# Patient Record
Sex: FEMALE | Race: WHITE | HISPANIC OR LATINO | URBAN - METROPOLITAN AREA
[De-identification: names, ages, dates, MRNs, and addresses within clinical notes are randomized per-mention and may not be internally consistent; named-entity substitution may affect disease eponyms.]

---

## 2017-06-23 ENCOUNTER — FOLLOW UP ESTABLISHED (OUTPATIENT)
Dept: URBAN - METROPOLITAN AREA CLINIC 60 | Facility: CLINIC | Age: 67
End: 2017-06-23
Payer: COMMERCIAL

## 2017-06-23 DIAGNOSIS — H04.123 TEAR FILM INSUFFICIENCY OF BILATERAL LACRIMAL GLANDS: Primary | ICD-10-CM

## 2017-06-23 PROCEDURE — 83861 MICROFLUID ANALY TEARS: CPT | Performed by: OPHTHALMOLOGY

## 2017-06-23 PROCEDURE — 92014 COMPRE OPH EXAM EST PT 1/>: CPT | Performed by: OPHTHALMOLOGY

## 2017-06-23 ASSESSMENT — VISUAL ACUITY
OS: 20/20
OD: 20/20

## 2017-06-23 ASSESSMENT — INTRAOCULAR PRESSURE
OS: 12
OD: 14

## 2019-06-06 ENCOUNTER — FOLLOW UP ESTABLISHED (OUTPATIENT)
Dept: URBAN - METROPOLITAN AREA CLINIC 60 | Facility: CLINIC | Age: 69
End: 2019-06-06
Payer: COMMERCIAL

## 2019-06-06 DIAGNOSIS — H11.153 PINGUECULA, BILATERAL: ICD-10-CM

## 2019-06-06 DIAGNOSIS — H18.59 OTHER HEREDITARY CORNEAL DYSTROPHIES: Primary | ICD-10-CM

## 2019-06-06 DIAGNOSIS — Z96.1 PRESENCE OF INTRAOCULAR LENS: ICD-10-CM

## 2019-06-06 PROCEDURE — 92014 COMPRE OPH EXAM EST PT 1/>: CPT | Performed by: OPTOMETRIST

## 2019-06-06 PROCEDURE — 92004 COMPRE OPH EXAM NEW PT 1/>: CPT | Performed by: OPTOMETRIST

## 2019-06-06 RX ORDER — SIMVASTATIN 20 MG/1
20 MG TABLET, FILM COATED ORAL
Qty: 0 | Refills: 0 | Status: ACTIVE
Start: 2019-06-06

## 2019-06-06 RX ORDER — SIMVASTATIN 20 MG/1
20 MG TABLET, FILM COATED ORAL
Qty: 0 | Refills: 0 | Status: INACTIVE
Start: 2019-06-06 | End: 2019-06-06

## 2019-06-06 ASSESSMENT — INTRAOCULAR PRESSURE
OD: 14
OS: 14

## 2019-06-06 ASSESSMENT — VISUAL ACUITY
OD: 20/30
OS: 20/40

## 2020-02-25 ENCOUNTER — OFFICE VISIT (OUTPATIENT)
Dept: URBAN - METROPOLITAN AREA CLINIC 62 | Facility: CLINIC | Age: 70
End: 2020-02-25
Payer: MEDICAID

## 2020-02-25 DIAGNOSIS — H59.89: Primary | ICD-10-CM

## 2020-02-25 PROCEDURE — 92002 INTRM OPH EXAM NEW PATIENT: CPT | Performed by: OPTOMETRIST

## 2020-02-25 PROCEDURE — 92071 CONTACT LENS FITTING FOR TX: CPT | Performed by: OPTOMETRIST

## 2020-02-25 RX ORDER — OFLOXACIN 3 MG/ML
0.3 % SOLUTION/ DROPS OPHTHALMIC
Qty: 1 | Refills: 0 | Status: INACTIVE
Start: 2020-02-25 | End: 2021-06-01

## 2020-02-25 ASSESSMENT — INTRAOCULAR PRESSURE
OS: 15
OD: 16

## 2020-02-25 NOTE — IMPRESSION/PLAN
Impression: Other postprocedural complication of eye: I68.13. Plan: Exposed suture @ 10 o'clock,  Discussed diagnosis in detail with patient. Refer to Dr Carlos Arechiga for removal. BCL placed today. Start ofloxacin OD BID until next visit.

## 2020-03-06 ENCOUNTER — OFFICE VISIT (OUTPATIENT)
Dept: URBAN - METROPOLITAN AREA CLINIC 62 | Facility: CLINIC | Age: 70
End: 2020-03-06
Payer: COMMERCIAL

## 2020-03-06 PROCEDURE — 99213 OFFICE O/P EST LOW 20 MIN: CPT | Performed by: OPHTHALMOLOGY

## 2020-03-06 NOTE — IMPRESSION/PLAN
Impression: Other postprocedural complication of eye: N60.31. Plan: Removed contact and  suture today. Cont. ofloxacin OD BID.

## 2020-03-10 ENCOUNTER — OFFICE VISIT (OUTPATIENT)
Dept: URBAN - METROPOLITAN AREA CLINIC 62 | Facility: CLINIC | Age: 70
End: 2020-03-10
Payer: COMMERCIAL

## 2020-03-10 PROCEDURE — 92012 INTRM OPH EXAM EST PATIENT: CPT | Performed by: OPTOMETRIST

## 2020-03-10 ASSESSMENT — INTRAOCULAR PRESSURE
OS: 13
OD: 15

## 2020-03-10 NOTE — IMPRESSION/PLAN
Impression: Other postprocedural complication of eye: B43.17. Plan: Resolved. No cornea staining. Continue Ofloxacin OD BID for 3 days, then stop.

## 2021-06-01 ENCOUNTER — OFFICE VISIT (OUTPATIENT)
Dept: URBAN - METROPOLITAN AREA CLINIC 60 | Facility: CLINIC | Age: 71
End: 2021-06-01
Payer: COMMERCIAL

## 2021-06-01 DIAGNOSIS — H43.813 VITREOUS DEGENERATION, BILATERAL: ICD-10-CM

## 2021-06-01 DIAGNOSIS — H52.4 PRESBYOPIA: ICD-10-CM

## 2021-06-01 DIAGNOSIS — H18.593 OTHER HEREDITARY CORNEAL DYSTROPHIES, BILATERAL: Primary | ICD-10-CM

## 2021-06-01 PROCEDURE — 92014 COMPRE OPH EXAM EST PT 1/>: CPT | Performed by: OPTOMETRIST

## 2021-06-01 ASSESSMENT — VISUAL ACUITY
OS: 20/40
OD: 20/25

## 2021-06-01 ASSESSMENT — INTRAOCULAR PRESSURE
OD: 19
OS: 16

## 2021-06-01 NOTE — IMPRESSION/PLAN
Impression: Other hereditary corneal dystrophies, bilateral: H18.593. EBM Plan: Patient educated regarding findings. No treatment. Monitor.